# Patient Record
Sex: FEMALE | Race: WHITE | NOT HISPANIC OR LATINO | URBAN - METROPOLITAN AREA
[De-identification: names, ages, dates, MRNs, and addresses within clinical notes are randomized per-mention and may not be internally consistent; named-entity substitution may affect disease eponyms.]

---

## 2024-02-15 ENCOUNTER — INPATIENT (INPATIENT)
Facility: HOSPITAL | Age: 59
LOS: 1 days | Discharge: HOME | DRG: 377 | End: 2024-02-17
Attending: INTERNAL MEDICINE | Admitting: INTERNAL MEDICINE
Payer: COMMERCIAL

## 2024-02-15 VITALS
OXYGEN SATURATION: 100 % | HEART RATE: 83 BPM | DIASTOLIC BLOOD PRESSURE: 78 MMHG | WEIGHT: 242.07 LBS | HEIGHT: 67 IN | TEMPERATURE: 100 F | SYSTOLIC BLOOD PRESSURE: 158 MMHG | RESPIRATION RATE: 17 BRPM

## 2024-02-15 DIAGNOSIS — K92.2 GASTROINTESTINAL HEMORRHAGE, UNSPECIFIED: ICD-10-CM

## 2024-02-15 LAB
ABO RH CONFIRMATION: SIGNIFICANT CHANGE UP
ALBUMIN SERPL ELPH-MCNC: 2.9 G/DL — LOW (ref 3.3–5)
ALP SERPL-CCNC: 97 U/L — SIGNIFICANT CHANGE UP (ref 30–120)
ALT FLD-CCNC: 23 U/L — SIGNIFICANT CHANGE UP (ref 10–60)
ANION GAP SERPL CALC-SCNC: 8 MMOL/L — SIGNIFICANT CHANGE UP (ref 5–17)
APTT BLD: 25.5 SEC — SIGNIFICANT CHANGE UP (ref 24.5–35.6)
AST SERPL-CCNC: 14 U/L — SIGNIFICANT CHANGE UP (ref 10–40)
BASOPHILS # BLD AUTO: 0.02 K/UL — SIGNIFICANT CHANGE UP (ref 0–0.2)
BASOPHILS NFR BLD AUTO: 0.3 % — SIGNIFICANT CHANGE UP (ref 0–2)
BILIRUB SERPL-MCNC: 0.1 MG/DL — LOW (ref 0.2–1.2)
BUN SERPL-MCNC: 15 MG/DL — SIGNIFICANT CHANGE UP (ref 7–23)
CALCIUM SERPL-MCNC: 9.7 MG/DL — SIGNIFICANT CHANGE UP (ref 8.4–10.5)
CHLORIDE SERPL-SCNC: 105 MMOL/L — SIGNIFICANT CHANGE UP (ref 96–108)
CO2 SERPL-SCNC: 28 MMOL/L — SIGNIFICANT CHANGE UP (ref 22–31)
CREAT SERPL-MCNC: 0.68 MG/DL — SIGNIFICANT CHANGE UP (ref 0.5–1.3)
EGFR: 100 ML/MIN/1.73M2 — SIGNIFICANT CHANGE UP
EOSINOPHIL # BLD AUTO: 0.22 K/UL — SIGNIFICANT CHANGE UP (ref 0–0.5)
EOSINOPHIL NFR BLD AUTO: 3.4 % — SIGNIFICANT CHANGE UP (ref 0–6)
GLUCOSE SERPL-MCNC: 145 MG/DL — HIGH (ref 70–99)
HCT VFR BLD CALC: 18.6 % — CRITICAL LOW (ref 34.5–45)
HGB BLD-MCNC: 5.2 G/DL — CRITICAL LOW (ref 11.5–15.5)
IMM GRANULOCYTES NFR BLD AUTO: 0.5 % — SIGNIFICANT CHANGE UP (ref 0–0.9)
INR BLD: 1 RATIO — SIGNIFICANT CHANGE UP (ref 0.85–1.18)
LYMPHOCYTES # BLD AUTO: 1.32 K/UL — SIGNIFICANT CHANGE UP (ref 1–3.3)
LYMPHOCYTES # BLD AUTO: 20.6 % — SIGNIFICANT CHANGE UP (ref 13–44)
MCHC RBC-ENTMCNC: 24.3 PG — LOW (ref 27–34)
MCHC RBC-ENTMCNC: 28 GM/DL — LOW (ref 32–36)
MCV RBC AUTO: 86.9 FL — SIGNIFICANT CHANGE UP (ref 80–100)
MONOCYTES # BLD AUTO: 0.47 K/UL — SIGNIFICANT CHANGE UP (ref 0–0.9)
MONOCYTES NFR BLD AUTO: 7.3 % — SIGNIFICANT CHANGE UP (ref 2–14)
NEUTROPHILS # BLD AUTO: 4.35 K/UL — SIGNIFICANT CHANGE UP (ref 1.8–7.4)
NEUTROPHILS NFR BLD AUTO: 67.9 % — SIGNIFICANT CHANGE UP (ref 43–77)
NRBC # BLD: 1 /100 WBCS — HIGH (ref 0–0)
OB PNL STL: POSITIVE
PLATELET # BLD AUTO: 487 K/UL — HIGH (ref 150–400)
POTASSIUM SERPL-MCNC: 3.5 MMOL/L — SIGNIFICANT CHANGE UP (ref 3.5–5.3)
POTASSIUM SERPL-SCNC: 3.5 MMOL/L — SIGNIFICANT CHANGE UP (ref 3.5–5.3)
PROT SERPL-MCNC: 6.4 G/DL — SIGNIFICANT CHANGE UP (ref 6–8.3)
PROTHROM AB SERPL-ACNC: 10.9 SEC — SIGNIFICANT CHANGE UP (ref 9.5–13)
RBC # BLD: 2.14 M/UL — LOW (ref 3.8–5.2)
RBC # FLD: 16.6 % — HIGH (ref 10.3–14.5)
SODIUM SERPL-SCNC: 141 MMOL/L — SIGNIFICANT CHANGE UP (ref 135–145)
WBC # BLD: 6.41 K/UL — SIGNIFICANT CHANGE UP (ref 3.8–10.5)
WBC # FLD AUTO: 6.41 K/UL — SIGNIFICANT CHANGE UP (ref 3.8–10.5)

## 2024-02-15 PROCEDURE — 99223 1ST HOSP IP/OBS HIGH 75: CPT

## 2024-02-15 PROCEDURE — 93010 ELECTROCARDIOGRAM REPORT: CPT

## 2024-02-15 PROCEDURE — 99285 EMERGENCY DEPT VISIT HI MDM: CPT

## 2024-02-15 PROCEDURE — 71045 X-RAY EXAM CHEST 1 VIEW: CPT | Mod: 26

## 2024-02-15 RX ORDER — OXYCODONE HYDROCHLORIDE 5 MG/1
2.5 TABLET ORAL ONCE
Refills: 0 | Status: DISCONTINUED | OUTPATIENT
Start: 2024-02-15 | End: 2024-02-15

## 2024-02-15 RX ORDER — PANTOPRAZOLE SODIUM 20 MG/1
40 TABLET, DELAYED RELEASE ORAL ONCE
Refills: 0 | Status: COMPLETED | OUTPATIENT
Start: 2024-02-15 | End: 2024-02-15

## 2024-02-15 RX ORDER — ACETAMINOPHEN 500 MG
2 TABLET ORAL
Refills: 0 | DISCHARGE

## 2024-02-15 RX ORDER — OXYCODONE HYDROCHLORIDE 5 MG/1
1 TABLET ORAL
Refills: 0 | DISCHARGE

## 2024-02-15 RX ADMIN — PANTOPRAZOLE SODIUM 40 MILLIGRAM(S): 20 TABLET, DELAYED RELEASE ORAL at 21:54

## 2024-02-15 RX ADMIN — OXYCODONE HYDROCHLORIDE 2.5 MILLIGRAM(S): 5 TABLET ORAL at 23:35

## 2024-02-15 NOTE — H&P ADULT - NSHPREVIEWOFSYSTEMS_GEN_ALL_CORE
-    CONSTITUTIONAL: No fever or chills, feels tired.  EYES: No eye pain, visual disturbances, or discharge.  ENMT:  No difficulty hearing, vertigo, sinus or throat pain.  NECK: No pain or stiffness.	  RESPIRATORY: No cough, wheezing, or hemoptysis; No shortness of breath.  CARDIOVASCULAR: No chest pain, palpitations, dizziness, or leg swelling.  GASTROINTESTINAL: No abdominal pain, no nausea, vomiting, or hematemesis; No diarrhea or Change in bowel habits. No melena or hematochezia.  GENITOURINARY: No dysuria, frequency, hematuria, or incontinence.  NEUROLOGICAL: No headaches, focal muscle weakness, numbness, or tremors.  SKIN: No itching, burning or rashes.  MUSCULOSKELETAL: No joint swelling or pain other than surgical site.  PSYCHIATRIC: No depression, anxiety, or agitation.  HEME/LYMPH: No easy bruising, bleeding gums, or nose bleed.  ALLERGY AND IMMUNOLOGIC: No hives or eczema.

## 2024-02-15 NOTE — ED ADULT NURSE NOTE - CHIEF COMPLAINT QUOTE
PT sent in by Dr Mehta for low hemoglobin; when I dry to walk my BP drops and when I sit down I feel better; I had blood drawn this morning; hemoglobin is 5.6; I am feeling more tired; pt had right hip replacement 01/29; hx anemia; follows with hematology; last iron infusion was Thanksgiving week

## 2024-02-15 NOTE — H&P ADULT - PROBLEM SELECTOR PLAN 4
high risk patient for VTE, pharmacological DVT prophylaxis is contraindicated at this time, started her on B/L intermittent pneumatic compression device for mechanical DVT prophylaxis.

## 2024-02-15 NOTE — ED PROVIDER NOTE - OBJECTIVE STATEMENT
58-year-old female past medical history of hypertension, anemia (gets iron transfusions,  last transfusion was 11/2023), recent right hip replacement 1/29/24 at [facility] presents to the ED today for hemoglobin of 5.4 on routine labs.  Patient reports she has been feeling fatigued since being sent home so her PCP Dr Mehta did labs today patient received a phone call stating to come to the ER.  Patient denies any chest pain, shortness of breath, abdominal pain, nausea vomiting, fever chills or all other complaints.  Patient reports she takes iron supplements and her stool has been dark recently.

## 2024-02-15 NOTE — H&P ADULT - PROBLEM SELECTOR PLAN 1
melena with hemoglobin down to 5.2 gm/dl, no hematemesis or hematochezia, last EGD reportedly negative in 2022, known hiatal hernia, using NSAIDs for pain control before & after the hip replacement surgery, admitted to the hospital, started her on PPI, GI consult with Dr. Mota was called.

## 2024-02-15 NOTE — ED ADULT NURSE NOTE - PAIN RATING/NUMBER SCALE (0-10): ACTIVITY
0 (no pain/absence of nonverbal indicators of pain) Crescentic Advancement Flap Text: The defect edges were debeveled with a #15 scalpel blade.  Given the location of the defect and the proximity to free margins a crescentic advancement flap was deemed most appropriate.  Using a sterile surgical marker, the appropriate advancement flap was drawn incorporating the defect and placing the expected incisions within the relaxed skin tension lines where possible.    The area thus outlined was incised deep to adipose tissue with a #15 scalpel blade.  The skin margins were undermined to an appropriate distance in all directions utilizing iris scissors.

## 2024-02-15 NOTE — ED PROVIDER NOTE - ATTENDING APP SHARED VISIT CONTRIBUTION OF CARE
Zi Albert MD: I have personally performed a face to face diagnostic evaluation on this patient.  I have reviewed the PA note and agree with the history, exam, and plan of care, except as noted.  History and Exam by me shows same findings as documented

## 2024-02-15 NOTE — ED ADULT TRIAGE NOTE - CHIEF COMPLAINT QUOTE
PT sent in by Dr Mehta for low hemoglobin; when I dry to walk my BP drops and when I sit down I feel better; I had blood drawn this morning; hemoglobin is 5.6; I am feeling more tired; pt had right hip replacement 01/29; hx anemia; follows with hematology; last iron infusion was Thanksgiving week PT sent in by Dr Mehta for low hemoglobin; when I dry to walk my BP drops and when I sit down I feel better; I had blood drawn this morning; hemoglobin is 5.4; I am feeling more tired; pt had right hip replacement 01/29; hx anemia; follows with hematology; last iron infusion was Thanksgiving week

## 2024-02-15 NOTE — ED PROVIDER NOTE - CLINICAL SUMMARY MEDICAL DECISION MAKING FREE TEXT BOX
Patient sent to emergency department for anemia.  Patient is 2 weeks status post hip replacement.  Found to have hemoglobin of 5.3 today.  Patient has a history of anemia in the past.  Never had a blood transfusion.  Patient complains of some generalized fatigue.  Has also noted some dark stools.  Will check stool for blood and initiate transfusions.

## 2024-02-15 NOTE — ED ADULT TRIAGE NOTE - WEIGHT METHOD
RX faxed and labs ordered per protocol.    
The patient is a 47y Female complaining of unresponsive.
stated

## 2024-02-15 NOTE — ED PROVIDER NOTE - PROGRESS NOTE DETAILS
TASHI Harrell: CBC noted, hemoglobin is 5.2/18.6.  Guaiac is positive.  Patient consented for blood transfusion TASHI Harrell: CBC noted, hemoglobin is 5.2/18.6.  Guaiac is positive.  Patient consented for blood transfusion. Protonix given.  Will admit for GIB. Reports last endoscopy and colonoscopy was 2022 and was normal per patient

## 2024-02-15 NOTE — ED PROVIDER NOTE - CARE PLAN
1 Principal Discharge DX:	Symptomatic anemia   Principal Discharge DX:	GIB (gastrointestinal bleeding)  Secondary Diagnosis:	Symptomatic anemia

## 2024-02-15 NOTE — H&P ADULT - NSHPPHYSICALEXAM_GEN_ALL_CORE
-    Vital Signs Last 24 Hrs  T(C): 37 (15 Feb 2024 23:39), Max: 37.5 (15 Feb 2024 20:28)  T(F): 98.6 (15 Feb 2024 23:39), Max: 99.5 (15 Feb 2024 20:28)  HR: 75 (15 Feb 2024 23:39) (75 - 95)  BP: 165/60 (15 Feb 2024 23:39) (149/70 - 165/60)  BP(mean): --  RR: 18 (15 Feb 2024 23:39) (17 - 18)  SpO2: 100% (15 Feb 2024 23:39) (99% - 100%)    Parameters below as of 15 Feb 2024 23:05  Patient On (Oxygen Delivery Method): room air          PHYSICAL EXAM:  		  GENERAL: NAD, well-groomed, well-developed, not in any distress.  HEAD:  Atraumatic, Norm cephalic.  EYES: PERRLA, conjunctiva clear.  ENMT: no nasal discharge, MMM.   NECK: Supple, No JVD.  NERVOUS SYSTEM:  Alert & oriented X3, neurologically intact grossly.  CHEST/LUNG: Good air entry B/L, no rales, rhonchi, or wheezing.  HEART: Normal S1 & S2, grade II/VII ETHEL max over cardiac base, no extra sounds.  ABDOMEN: Soft, non-tender, obese non-distended; bowel sounds present, no palpable masses, (+) hepatomegally.  EXTREMITIES:  No clubbing, cyanosis, or lower extremity edema.  VASCULAR: 2+ radial, DPA / PTA pulses B/L.  SKIN: No rashes or lesions, (+) dry skin..  PSYCH: normal affect & behavior.

## 2024-02-15 NOTE — ED ADULT NURSE NOTE - NSFALLCONCLUSION_ED_ALL_ED
History  Chief Complaint   Patient presents with   • Palpitations     Pt arrives from home c/o "racing heart" x45 minutes. C/o SOB as well. This is a 50-year-old female who presents with palpitations chest tightness shortness of breath lightheadedness that started 45 minutes prior to arrival she is noted to be in a rapid SVT with heart rate in the 200s with a goal maneuvers were attempted without success      History provided by:  Patient  Medical Problem  Location:  Cardiac  Quality:  Palpitations  Severity:  Severe  Onset quality:  Sudden  Duration:  45 minutes  Timing:  Constant  Progression:  Unchanged  Chronicity:  New  Context:  Palpitations lightheadedness dizziness and shortness of breath x45 minutes  Associated symptoms: shortness of breath        Prior to Admission Medications   Prescriptions Last Dose Informant Patient Reported? Taking? Elagolix Sodium (Orilissa) 150 MG TABS   Yes No   Sig: Take by mouth One daily   NON FORMULARY  Self Yes No   Sig: Medical Marijuana   Sod Picosulfate-Mag Ox-Cit Acd (Clenpiq) 10-3.5-12 MG-GM -GM/160ML SOLN   No No   Sig: As directed (1 kit)   Patient not taking: Reported on 2023   cyclobenzaprine (FLEXERIL) 5 mg tablet  Self No No   Sig: Take 1 tablet (5 mg total) by mouth 3 (three) times a day as needed for muscle spasms   Patient not taking: Reported on 3/22/2022       Facility-Administered Medications: None       Past Medical History:   Diagnosis Date   • Anxiety    • Endometriosis    • PTSD (post-traumatic stress disorder)        Past Surgical History:   Procedure Laterality Date   •  SECTION     • LAPAROSCOPY     • TUBAL LIGATION         Family History   Problem Relation Age of Onset   • Hypertension Mother    • Colon cancer Neg Hx    • Colon polyps Neg Hx    • Inflammatory bowel disease Neg Hx    • Celiac disease Neg Hx      I have reviewed and agree with the history as documented.     E-Cigarette/Vaping   • E-Cigarette Use Never User E-Cigarette/Vaping Substances   • Nicotine No    • THC No    • CBD No    • Flavoring No    • Other No    • Unknown No      Social History     Tobacco Use   • Smoking status: Never   • Smokeless tobacco: Never   Vaping Use   • Vaping Use: Never used   Substance Use Topics   • Drug use: Yes     Types: Marijuana     Comment: medical marijuana       Review of Systems   Respiratory: Positive for chest tightness and shortness of breath. Neurological: Positive for light-headedness. All other systems reviewed and are negative. Physical Exam  Physical Exam  Vitals and nursing note reviewed. Constitutional:       General: She is in acute distress. HENT:      Head: Normocephalic and atraumatic. Right Ear: External ear normal.      Left Ear: External ear normal.   Eyes:      General: No scleral icterus. Right eye: No discharge. Left eye: No discharge. Extraocular Movements: Extraocular movements intact. Pupils: Pupils are equal, round, and reactive to light. Cardiovascular:      Rate and Rhythm: Regular rhythm. Tachycardia present. Pulses: Normal pulses. Heart sounds: No murmur heard. No friction rub. No gallop. Pulmonary:      Effort: Pulmonary effort is normal. No respiratory distress. Breath sounds: No stridor. No wheezing, rhonchi or rales. Abdominal:      General: There is no distension. Palpations: Abdomen is soft. Tenderness: There is no abdominal tenderness. There is no guarding or rebound. Musculoskeletal:         General: No swelling, tenderness, deformity or signs of injury. Normal range of motion. Cervical back: Normal range of motion and neck supple. No rigidity or tenderness. Right lower leg: No edema. Left lower leg: No edema. Skin:     General: Skin is warm and dry. Findings: No erythema or rash. Neurological:      General: No focal deficit present.       Mental Status: She is alert and oriented to person, place, and time. Cranial Nerves: No cranial nerve deficit. Sensory: No sensory deficit. Motor: No weakness.       Coordination: Coordination normal.   Psychiatric:      Comments: Anxious         Vital Signs  ED Triage Vitals   Temperature Pulse Respirations Blood Pressure SpO2   09/22/23 1100 09/22/23 0912 09/22/23 0912 09/22/23 0912 09/22/23 0912   98.4 °F (36.9 °C) (!) 212 20 139/88 100 %      Temp src Heart Rate Source Patient Position - Orthostatic VS BP Location FiO2 (%)   -- 09/22/23 0912 09/22/23 0912 09/22/23 0912 --    Monitor Lying Right arm       Pain Score       09/22/23 0912       No Pain           Vitals:    09/22/23 0930 09/22/23 1045 09/22/23 1100 09/22/23 1145   BP: 120/74 113/74 124/57 102/68   Pulse: 76 73 69 68   Patient Position - Orthostatic VS: Sitting Sitting  Sitting         Visual Acuity      ED Medications  Medications   metoprolol succinate (TOPROL-XL) 24 hr tablet 25 mg (has no administration in time range)   adenosine (ADENOCARD) 6 mg/2 mL injection **ADS Override Pull** (  Given 9/22/23 0921)       Diagnostic Studies  Results Reviewed     Procedure Component Value Units Date/Time    HS Troponin 0hr (reflex protocol) [[id number]]  (Normal) Collected: 09/22/23 0924    Lab Status: Final result Specimen: Blood from Arm, Right Updated: 09/22/23 1000     hs TnI 0hr 2 ng/L     Comprehensive metabolic panel [[id number]] Collected: 09/22/23 0924    Lab Status: Final result Specimen: Blood from Arm, Right Updated: 09/22/23 0952     Sodium 136 mmol/L      Potassium 3.7 mmol/L      Chloride 105 mmol/L      CO2 22 mmol/L      ANION GAP 9 mmol/L      BUN 14 mg/dL      Creatinine 0.94 mg/dL      Glucose 126 mg/dL      Calcium 9.7 mg/dL      AST 21 U/L      ALT 10 U/L      Alkaline Phosphatase 37 U/L      Total Protein 8.0 g/dL      Albumin 4.9 g/dL      Total Bilirubin 0.66 mg/dL      eGFR 74 ml/min/1.73sq m     Narrative:      [place] guidelines for Chronic Kidney Disease (CKD):   •  Stage 1 with normal or high GFR (GFR > 90 mL/min/1.73 square meters)  •  Stage 2 Mild CKD (GFR = 60-89 mL/min/1.73 square meters)  •  Stage 3A Moderate CKD (GFR = 45-59 mL/min/1.73 square meters)  •  Stage 3B Moderate CKD (GFR = 30-44 mL/min/1.73 square meters)  •  Stage 4 Severe CKD (GFR = 15-29 mL/min/1.73 square meters)  •  Stage 5 End Stage CKD (GFR <15 mL/min/1.73 square meters)  Note: GFR calculation is accurate only with a steady state creatinine    CBC and differential [[id number]] Collected: 09/22/23 0924    Lab Status: Final result Specimen: Blood from Arm, Right Updated: 09/22/23 0936     WBC 7.69 Thousand/uL      RBC 4.34 Million/uL      Hemoglobin 13.5 g/dL      Hematocrit 41.5 %      MCV 96 fL      MCH 31.1 pg      MCHC 32.5 g/dL      RDW 13.2 %      MPV 10.3 fL      Platelets 865 Thousands/uL      nRBC 0 /100 WBCs      Neutrophils Relative 55 %      Immat GRANS % 0 %      Lymphocytes Relative 35 %      Monocytes Relative 7 %      Eosinophils Relative 2 %      Basophils Relative 1 %      Neutrophils Absolute 4.31 Thousands/µL      Immature Grans Absolute 0.03 Thousand/uL      Lymphocytes Absolute 2.65 Thousands/µL      Monocytes Absolute 0.53 Thousand/µL      Eosinophils Absolute 0.12 Thousand/µL      Basophils Absolute 0.05 Thousands/µL                  XR chest 1 view portable   Final Result by Angel Beatty MD (09/22 6149)      No acute cardiopulmonary disease.                Workstation performed: [id number]                    Procedures  ECG 12 Lead Documentation Only    Date/Time: 9/22/2023 9:24 AM    Performed by: Wilmar Fritz DO  Authorized by: Wilmar Fritz DO    ECG reviewed by me, the ED Provider: yes    Patient location:  ED  Rate:     ECG rate:  78  Rhythm:     Rhythm: sinus rhythm    Conduction:     Conduction: normal    T waves:     T waves: normal      CriticalCare Time    Date/Time: 9/22/2023 12:03 PM    Performed by: Wilmar Fritz   Authorized by: Phi Gonzalez DO    Critical care provider statement:     Critical care time (minutes):  30    Critical care time was exclusive of:  Separately billable procedures and treating other patients    Critical care was necessary to treat or prevent imminent or life-threatening deterioration of the following conditions: Chemical cardioversion. Critical care was time spent personally by me on the following activities:  Discussions with consultants, evaluation of patient's response to treatment, examination of patient, interpretation of cardiac output measurements, ordering and performing treatments and interventions, ordering and review of laboratory studies, ordering and review of radiographic studies and re-evaluation of patient's condition    I assumed direction of critical care for this patient from another provider in my specialty: no                   ED Course  ED Course as of 09/22/23 1204   Fri Sep 22, 2023   1153 Discussed case with cardiology Dr. Alex paige to start patient on metoprolol 25 mg XL and follow-up as outpatient with electrophysiology                                             Medical Decision Making  Narrow complex tachycardia vagal maneuvers attempted without success will give adenosine check lab work    Amount and/or Complexity of Data Reviewed  Labs: ordered. Radiology: ordered. Disposition  Final diagnoses:   SVT (supraventricular tachycardia) ([address])     Time reflects when diagnosis was documented in both MDM as applicable and the Disposition within this note     Time User Action Codes Description Comment    9/22/2023  9:40 AM Taran Quiet Add [I47.1] SVT (supraventricular tachycardia)       ED Disposition     ED Disposition   Discharge    Condition   Stable    Date/Time   Fri Sep 22, 2023 11:56 AM    Comment   Livia Rebolledo discharge to home/self care.                Follow-up Information     Follow up With Specialties Details Why Contact Info    [facility] Walker Chu DO Cardiology   [address] [facility]  [address]  [address] [address]  [phone]            Patient's Medications   Discharge Prescriptions    METOPROLOL SUCCINATE (TOPROL-XL) 25 MG 24 HR TABLET    Take 1 tablet (25 mg total) by mouth daily       Start Date: 9/22/2023 End Date: --       Order Dose: 25 mg       Quantity: 20 tablet    Refills: 0           PDMP Review     None          ED Provider  Electronically Signed by           Patti Funes DO  09/22/23 1200 Universal Safety Interventions

## 2024-02-15 NOTE — H&P ADULT - NSHPLABSRESULTS_GEN_ALL_CORE
-                          5.2    6.41  )-----------( 487      ( 15 Feb 2024 21:07 )             18.6       15 Feb 2024 21:07    141    |  105    |  15     ----------------------------<  145    3.5     |  28     |  0.68     Ca    9.7        15 Feb 2024 21:07    TPro  6.4    /  Alb  2.9    /  TBili  0.1    /  DBili  x      /  AST  14     /  ALT  23     /  AlkPhos  97     15 Feb 2024 21:07    LIVER FUNCTIONS - ( 15 Feb 2024 21:07 )  Alb: 2.9 g/dL / Pro: 6.4 g/dL / ALK PHOS: 97 U/L / ALT: 23 U/L / AST: 14 U/L / GGT: x           PT/INR - ( 15 Feb 2024 21:07 )   PT: 10.9 sec;   INR: 1.00 ratio    PTT - ( 15 Feb 2024 21:07 )  PTT:25.5 sec      Urinalysis Basic - ( 15 Feb 2024 21:07 )  Color: x / Appearance: x / SG: x / pH: x  Gluc: 145 mg/dL / Ketone: x  / Bili: x / Urobili: x   Blood: x / Protein: x / Nitrite: x   Leuk Esterase: x / RBC: x / WBC x   Sq Epi: x / Non Sq Epi: x / Bacteria: x        Occult Blood, Feces (02.15.24 @ 21:12)   Occult Blood, Feces: Positive        CXR:    As per my review shows normal cardiac shadow size, clear lung fields B/L, no pulmonary infiltrates, pleural effusion, or pneumothorax. Pending official report.           EKG:    As per my review shows SR at 80/min, normal ID & QTc intervals, normal QRS voltage, duration, and axis (zero), with normal transition, no ST-T abnormality.    -

## 2024-02-15 NOTE — H&P ADULT - PROBLEM SELECTOR PLAN 2
reports feeling tiered & out of energy, gets fatigued very reasy since the surgery, also reported getting light headed on any mobility after the surgery & was not allowed to go back home after surgery for this reason, patient received 1 unit of PRBCs so far, and currently with the 2nd unit, will check H & H after transfusion & transfuse as needed, check TSH, please consider parenteral iron transfusion prior to discharge. Patient is following up for this issue with a hematologist as an outpatient.

## 2024-02-15 NOTE — PATIENT PROFILE ADULT - FALL HARM RISK - HARM RISK INTERVENTIONS

## 2024-02-15 NOTE — ED PROVIDER NOTE - PHYSICAL EXAMINATION
Gen: Pale skin,  NAD  Head: atraumatic  Heart: s1/s2, RRR  Lung: CTA b/l,   Abd: soft, NT/ND, no rebound or guarding,   GUIAC: +dark stool on exam   Msk: no pedal edema  Neuro: AAO x3, patient moving all extremity equally, no focal neuro deficits noted  Skin: right hip surgical wound is well appearing, no signs of infection noted.   Psych: Alert and oriented

## 2024-02-15 NOTE — ED ADULT NURSE NOTE - OBJECTIVE STATEMENT
Pt sent by dr wei for low hemoglobin, pt stated it was 5.6, has been feeling very tired, and whenever she tries to walk, she feels lightheaded, and better when she sits down, had iron transfusion in november.

## 2024-02-15 NOTE — H&P ADULT - PROBLEM SELECTOR PLAN 3
RPG of 145 mg/dl, no H/O DM or glucose intolerance, will check glycated hemoglobin level with estimated average plasma glucose level in am.

## 2024-02-15 NOTE — H&P ADULT - HISTORY OF PRESENT ILLNESS
This is a 57 y/o F with PMH of HTN, RIYA on parenteral iron therapy, Obesity, and OA who was sent by her PCP for blood transfusion. Patient was feeling weak & tired, and had no energy, seen by her PCP yesterday who ran blood tests, today he called her & sent her to the ED as her hemoglobin is very low. At the ED she was found with hemoglobin of 5.2 gm/dl. Of note that she was seeing a hematologist for RIYA, receiving IV iron on regular basis, last she received 4 units in october & one immediately after thanksgiving, in preparation to her total hip arthroplasty that she had on January 29th, failed PT on POD1 because she was feeling light headed every time she tries to walk, her pre-op Hb was 11.3 gm/dl, her post-op Hb was 9.6 gm/dl, no visible bleeding from anywhere, reports black stools, she is using PO iron supplements at home, tested positive for FOB at the ED.  Patient denies any chest pain or SOB, no abdominal pain or acid reflux symptoms. Last EGD & colonoscopy performed in 2022, both reportedly were "ok".

## 2024-02-15 NOTE — H&P ADULT - ASSESSMENT
57 y/o F with PMH of HTN, RIYA on parenteral iron therapy, Obesity, and OA sent for blood transfusion.

## 2024-02-16 DIAGNOSIS — R73.09 OTHER ABNORMAL GLUCOSE: ICD-10-CM

## 2024-02-16 DIAGNOSIS — Z96.641 PRESENCE OF RIGHT ARTIFICIAL HIP JOINT: Chronic | ICD-10-CM

## 2024-02-16 DIAGNOSIS — D64.9 ANEMIA, UNSPECIFIED: ICD-10-CM

## 2024-02-16 DIAGNOSIS — Z96.641 PRESENCE OF RIGHT ARTIFICIAL HIP JOINT: ICD-10-CM

## 2024-02-16 DIAGNOSIS — Z29.9 ENCOUNTER FOR PROPHYLACTIC MEASURES, UNSPECIFIED: ICD-10-CM

## 2024-02-16 DIAGNOSIS — K92.2 GASTROINTESTINAL HEMORRHAGE, UNSPECIFIED: ICD-10-CM

## 2024-02-16 LAB
BASOPHILS # BLD AUTO: 0.03 K/UL — SIGNIFICANT CHANGE UP (ref 0–0.2)
BASOPHILS NFR BLD AUTO: 0.4 % — SIGNIFICANT CHANGE UP (ref 0–2)
EOSINOPHIL # BLD AUTO: 0.22 K/UL — SIGNIFICANT CHANGE UP (ref 0–0.5)
EOSINOPHIL NFR BLD AUTO: 2.8 % — SIGNIFICANT CHANGE UP (ref 0–6)
HCT VFR BLD CALC: 22.8 % — LOW (ref 34.5–45)
HGB BLD-MCNC: 6.7 G/DL — CRITICAL LOW (ref 11.5–15.5)
IMM GRANULOCYTES NFR BLD AUTO: 0.8 % — SIGNIFICANT CHANGE UP (ref 0–0.9)
LYMPHOCYTES # BLD AUTO: 1.3 K/UL — SIGNIFICANT CHANGE UP (ref 1–3.3)
LYMPHOCYTES # BLD AUTO: 16.4 % — SIGNIFICANT CHANGE UP (ref 13–44)
MCHC RBC-ENTMCNC: 24.9 PG — LOW (ref 27–34)
MCHC RBC-ENTMCNC: 29.4 GM/DL — LOW (ref 32–36)
MCV RBC AUTO: 84.8 FL — SIGNIFICANT CHANGE UP (ref 80–100)
MONOCYTES # BLD AUTO: 0.6 K/UL — SIGNIFICANT CHANGE UP (ref 0–0.9)
MONOCYTES NFR BLD AUTO: 7.6 % — SIGNIFICANT CHANGE UP (ref 2–14)
NEUTROPHILS # BLD AUTO: 5.73 K/UL — SIGNIFICANT CHANGE UP (ref 1.8–7.4)
NEUTROPHILS NFR BLD AUTO: 72 % — SIGNIFICANT CHANGE UP (ref 43–77)
NRBC # BLD: 0 /100 WBCS — SIGNIFICANT CHANGE UP (ref 0–0)
PLATELET # BLD AUTO: 468 K/UL — HIGH (ref 150–400)
RBC # BLD: 2.69 M/UL — LOW (ref 3.8–5.2)
RBC # FLD: 17.2 % — HIGH (ref 10.3–14.5)
TSH SERPL-MCNC: 3.54 UIU/ML — SIGNIFICANT CHANGE UP (ref 0.27–4.2)
WBC # BLD: 7.94 K/UL — SIGNIFICANT CHANGE UP (ref 3.8–10.5)
WBC # FLD AUTO: 7.94 K/UL — SIGNIFICANT CHANGE UP (ref 3.8–10.5)

## 2024-02-16 PROCEDURE — 99233 SBSQ HOSP IP/OBS HIGH 50: CPT

## 2024-02-16 PROCEDURE — 88312 SPECIAL STAINS GROUP 1: CPT | Mod: 26

## 2024-02-16 PROCEDURE — 88342 IMHCHEM/IMCYTCHM 1ST ANTB: CPT | Mod: 26

## 2024-02-16 PROCEDURE — 88305 TISSUE EXAM BY PATHOLOGIST: CPT | Mod: 26

## 2024-02-16 DEVICE — CLIP RESOLUTION 360 235CM: Type: IMPLANTABLE DEVICE | Status: FUNCTIONAL

## 2024-02-16 DEVICE — ESOPHAGEAL BALLOON CATH CRE FIXED WIRE 12-13.5-15MM: Type: IMPLANTABLE DEVICE | Status: FUNCTIONAL

## 2024-02-16 DEVICE — SPEEDBAND SPRVW 7: Type: IMPLANTABLE DEVICE | Status: FUNCTIONAL

## 2024-02-16 DEVICE — RESOLUTION CLIP HEMOSTATIC DEVICE: Type: IMPLANTABLE DEVICE | Status: FUNCTIONAL

## 2024-02-16 DEVICE — RETRIEVAL FOOD BOLUS ROTHNET: Type: IMPLANTABLE DEVICE | Status: FUNCTIONAL

## 2024-02-16 RX ORDER — PANTOPRAZOLE SODIUM 20 MG/1
40 TABLET, DELAYED RELEASE ORAL
Refills: 0 | Status: DISCONTINUED | OUTPATIENT
Start: 2024-02-16 | End: 2024-02-16

## 2024-02-16 RX ORDER — OXYCODONE HYDROCHLORIDE 5 MG/1
5 TABLET ORAL EVERY 4 HOURS
Refills: 0 | Status: DISCONTINUED | OUTPATIENT
Start: 2024-02-16 | End: 2024-02-17

## 2024-02-16 RX ORDER — PANTOPRAZOLE SODIUM 20 MG/1
40 TABLET, DELAYED RELEASE ORAL
Refills: 0 | Status: DISCONTINUED | OUTPATIENT
Start: 2024-02-16 | End: 2024-02-17

## 2024-02-16 RX ORDER — ACETAMINOPHEN 500 MG
650 TABLET ORAL EVERY 6 HOURS
Refills: 0 | Status: DISCONTINUED | OUTPATIENT
Start: 2024-02-16 | End: 2024-02-17

## 2024-02-16 RX ORDER — SUCRALFATE 1 G
1 TABLET ORAL EVERY 12 HOURS
Refills: 0 | Status: DISCONTINUED | OUTPATIENT
Start: 2024-02-16 | End: 2024-02-17

## 2024-02-16 RX ADMIN — Medication 650 MILLIGRAM(S): at 12:45

## 2024-02-16 RX ADMIN — Medication 650 MILLIGRAM(S): at 05:06

## 2024-02-16 RX ADMIN — OXYCODONE HYDROCHLORIDE 5 MILLIGRAM(S): 5 TABLET ORAL at 05:06

## 2024-02-16 RX ADMIN — PANTOPRAZOLE SODIUM 40 MILLIGRAM(S): 20 TABLET, DELAYED RELEASE ORAL at 05:07

## 2024-02-16 RX ADMIN — Medication 650 MILLIGRAM(S): at 20:23

## 2024-02-16 RX ADMIN — OXYCODONE HYDROCHLORIDE 5 MILLIGRAM(S): 5 TABLET ORAL at 05:36

## 2024-02-16 RX ADMIN — Medication 650 MILLIGRAM(S): at 19:53

## 2024-02-16 RX ADMIN — Medication 1 GRAM(S): at 17:59

## 2024-02-16 RX ADMIN — Medication 650 MILLIGRAM(S): at 05:36

## 2024-02-16 RX ADMIN — Medication 650 MILLIGRAM(S): at 12:15

## 2024-02-16 RX ADMIN — OXYCODONE HYDROCHLORIDE 2.5 MILLIGRAM(S): 5 TABLET ORAL at 00:55

## 2024-02-16 NOTE — CARE COORDINATION ASSESSMENT. - NSCAREPROVIDERS_GEN_ALL_CORE_FT
CARE PROVIDERS:  Accepting Physician: Selam Gatica  Administration: Elan Burdick  Administration: Lucila Cross  Administration: Ori Bradford  Admitting: Selam Gatica  Attending: eSlam Gatica  Case Management: Miguelito Brumfield  Consultant: Pravin Mota  Covering Team: MARCELLA Schmitz  ED ACP: Christina Harrell  ED Attending: Zi Albert ED Nurse: Jasmina Peña  HIM/Billing & Coding: Saman Carrasquillo  Infection Control: Bonnie Fitch  Nurse: Jamaica Irby  Nurse: Shannon Moreno  Nurse: Batsheva Fitch  Nurse: Jose Negron  Ordered: Doctor, Unknown  Override: Batsheva Fitch  Override: Jose Negron  PCA/Nursing Assistant: Nita Berg  PCA/Nursing Assistant: Nu Guadarrama  Physical Therapy: Lynette Martinez  Registered Dietitian: Luz Schaefer  Team: GLORIA  Hospitalists, Team  UR// Supp. Assoc.: Evelia Zamorano

## 2024-02-16 NOTE — CHART NOTE - NSCHARTNOTEFT_GEN_A_CORE
Patient is medically cleared for upper endoscopy. She has no known cardiac or pulmonary issues. She has received 3 units with expected Hg to be above 7. No contraindication to proceeding.

## 2024-02-16 NOTE — CONSULT NOTE ADULT - ASSESSMENT
anemia  gi bleed melena   s/p surgery blood loss    p;melisa  npo  ivf   upper gastrointestinal endoscopy today  after prbc transfusion  ppi bid   to discuss with patient

## 2024-02-16 NOTE — CARE COORDINATION ASSESSMENT. - NSDCPLANSERVICES_GEN_ALL_CORE
59 y/o F with PMH of HTN, RIYA on parenteral iron therapy, Obesity, and OA sent for blood transfusion.        Patient is receiving her 3th unit of blood today.  Pending EGD.    CM met with patient at bedside. Patient is alert times 3.  Patient stated lives with spouse at home Renato [phone] CM spoke with him this morning.  Patient stated owns a cane and walker and its at the bedside. Patient stated lives in a split level house has 6 steps to enter and 7 steps to get to bedroom.  Prior to patient coming into the hospital patient has been receiving [facility] [phone] Home PT from her hip surgery she had on January 29,2024 at [facility].    CM verified:     PCP: Tyson Hawkins [phone].  Insurance: HTP.  Pharmacy: [address]  : Patient stated No.     CM explained about homecare services with patient with a verbal understanding.  Patient Pending a PT consult. Patient stated wants [facility] [phone] because had prior to coming into hospital. Will continue to follow case./Home Care

## 2024-02-16 NOTE — CONSULT NOTE ADULT - SUBJECTIVE AND OBJECTIVE BOX
Chief Complaint:  Patient is a 58y old  Female who presents with a chief complaint of Sent for abnormal labs called to see pt for anmeia with history of melelna and anemia  had anemia as an outpt This is a 57 y/o F with PMH of HTN, RIYA on parenteral iron therapy, Obesity, and OA who was sent by her PCP for blood transfusion. Patient was feeling weak & tired, and had no energy, seen by her PCP yesterday who ran blood tests, today he called her & sent her to the ED as her hemoglobin is very low. At the ED she was found with hemoglobin of 5.2 gm/dl. Of note that she was seeing a hematologist for RIYA, receiving IV iron on regular basis, last she received 4 units in october & one immediately after thanksgiving, in preparation to her total hip arthroplasty that she had on January 29th, failed PT on POD1 because she was feeling light headed every time she tries to walk, her pre-op Hb was 11.3 gm/dl, her post-op Hb was 9.6 gm/dl, no visible bleeding from anywhere, reports black stools, she is using PO iron supplements at home, tested positive for FOB at the ED.  Patient denies any chest pain or SOB, no abdominal pain or acid reflux symptoms. Last EGD & colonoscopy performed in 2022, both reportedly were "ok".       Review of Systems:  Review of Systems: -    CONSTITUTIONAL: No fever or chills, feels tired.  EYES: No eye pain, visual disturbances, or discharge.  ENMT:  No difficulty hearing, vertigo, sinus or throat pain.  NECK: No pain or stiffness.	  RESPIRATORY: No cough, wheezing, or hemoptysis; No shortness of breath.  CARDIOVASCULAR: No chest pain, palpitations, dizziness, or leg swelling.  GASTROINTESTINAL: No abdominal pain, no nausea, vomiting, or hematemesis; No diarrhea or Change in bowel habits. No melena or hematochezia.  GENITOURINARY: No dysuria, frequency, hematuria, or incontinence.  NEUROLOGICAL: No headaches, focal muscle weakness, numbness, or tremors.  SKIN: No itching, burning or rashes.  MUSCULOSKELETAL: No joint swelling or pain other than surgical site.  PSYCHIATRIC: No depression, anxiety, or agitation.  HEME/LYMPH: No easy bruising, bleeding gums, or nose bleed.  ALLERGY AND IMMUNOLOGIC: No hives or eczema.      Allergies:  Berries (Unknown)  Tur[place] (Unknown)  Mushrooms (Unknown)  No Known Drug Allergies      Medications:  acetaminophen     Tablet .. 650 milliGRAM(s) Oral every 6 hours PRN  oxyCODONE    IR 5 milliGRAM(s) Oral every 4 hours PRN      PMHX/PSHX:  Iron deficiency anemia    OA (osteoarthritis)    Obesity    S/P hip replacement, right        Family history:      Social History:     ROS:     General:  No wt loss, fevers, chills, night sweats, fatigue,   Eyes:  Good vision, no reported pain  ENT:  No sore throat, pain, runny nose, dysphagia  CV:  No pain, palpitations, hypo/hypertension  Resp:  No dyspnea, cough, tachypnea, wheezing  GI:  No pain, No nausea, No vomiting, No diarrhea, No constipation, No weight loss, No fever, No pruritis, No rectal bleeding, No tarry stools, No dysphagia,  :  No pain, bleeding, incontinence, nocturia  Muscle:  No pain, weakness  Neuro:  No weakness, tingling, memory problems  Psych:  No fatigue, insomnia, mood problems, depression  Endocrine:  No polyuria, polydipsia, cold/heat intolerance  Heme:  No petechiae, ecchymosis, easy bruisability  Skin:  No rash, tattoos, scars, edema      PHYSICAL EXAM:   Vital Signs:  Vital Signs Last 24 Hrs  T(C): 37.1 (16 Feb 2024 05:56), Max: 37.5 (15 Feb 2024 20:28)  T(F): 98.7 (16 Feb 2024 05:56), Max: 99.5 (15 Feb 2024 20:28)  HR: 73 (16 Feb 2024 05:56) (73 - 95)  BP: 139/80 (16 Feb 2024 05:56) (128/78 - 165/60)  BP(mean): --  RR: 17 (16 Feb 2024 05:56) (17 - 18)  SpO2: 98% (16 Feb 2024 05:56) (97% - 100%)    Parameters below as of 16 Feb 2024 05:56  Patient On (Oxygen Delivery Method): room air      Daily Height in cm: 170.18 (15 Feb 2024 20:28)    Daily     GENERAL:  Appears stated age, well-groomed, well-nourished, no distress  HEENT:  NC/AT,  conjunctivae clear and pink, no thyromegaly, nodules, adenopathy, no JVD, sclera -anicteric  CHEST:  Full & symmetric excursion, no increased effort, breath sounds clear  HEART:  Regular rhythm, S1, S2, no murmur/rub/S3/S4, no abdominal bruit, no edema  ABDOMEN:  Soft, non-tender, non-distended, normoactive bowel sounds,  no masses ,no hepato-splenomegaly, no signs of chronic liver disease  EXTEREMITIES:  no cyanosis,clubbing or edema  SKIN:  No rash/erythema/ecchymoses/petechiae/wounds/abscess/warm/dry  NEURO:  Alert, oriented, no asterixis, no tremor, no encephalopathy    LABS:                        5.2    6.41  )-----------( 487      ( 15 Feb 2024 21:07 )             18.6     02-15    141  |  105  |  15  ----------------------------<  145<H>  3.5   |  28  |  0.68    Ca    9.7      15 Feb 2024 21:07    TPro  6.4  /  Alb  2.9<L>  /  TBili  0.1<L>  /  DBili  x   /  AST  14  /  ALT  23  /  AlkPhos  97  02-15    LIVER FUNCTIONS - ( 15 Feb 2024 21:07 )  Alb: 2.9 g/dL / Pro: 6.4 g/dL / ALK PHOS: 97 U/L / ALT: 23 U/L / AST: 14 U/L / GGT: x           PT/INR - ( 15 Feb 2024 21:07 )   PT: 10.9 sec;   INR: 1.00 ratio         PTT - ( 15 Feb 2024 21:07 )  PTT:25.5 sec  Urinalysis Basic - ( 15 Feb 2024 21:07 )    Color: x / Appearance: x / SG: x / pH: x  Gluc: 145 mg/dL / Ketone: x  / Bili: x / Urobili: x   Blood: x / Protein: x / Nitrite: x   Leuk Esterase: x / RBC: x / WBC x   Sq Epi: x / Non Sq Epi: x / Bacteria: x          Imaging:          
Normal vision: sees adequately in most situations; can see medication labels, newsprint

## 2024-02-16 NOTE — CARE COORDINATION ASSESSMENT. - OTHER PERTINENT DISCHARGE PLANNING INFORMATION:
57 y/o F with PMH of HTN, RIYA on parenteral iron therapy, Obesity, and OA sent for blood transfusion. Met patient at bedside.  Explained role of CM, verbalized understanding. Pt was made aware a CM will remain available through hospitalization.  Contact information given in discharge/ transitions resource folder.

## 2024-02-16 NOTE — CARE COORDINATION ASSESSMENT. - PRO ARRIVE FROM
DX:59 y/o F with PMH of HTN, RIYA on parenteral iron therapy, Obesity, and OA sent for blood transfusion.     Patient is receiving her 3th unit of blood today.  Pending EGD.    CM met with patient at bedside. Patient is alert times 3.  Patient stated lives with spouse at home Ren[place] [phone] CM spoke with him this morning.  Patient stated owns a cane and walker and its at the bedside. Patient stated lives in a split level house has 6 steps to enter and 7 steps to get to bedroom.  Prior to patient coming into the hospital patient has been receiving [facility] [phone] Home PT from her hip surgery she had on January 29,2024 at [facility].    CM verified:     PCP: Tyson Hawkins [phone].  Insurance: [employer].  Pharmacy: [address]  : Patient stated No.     CM explained about homecare services with patient with a verbal understanding.  Patient Pending a PT consult. Patient stated wants [facility] [phone] because had prior to coming into hospital. Will continue to follow case./home

## 2024-02-16 NOTE — PROGRESS NOTE ADULT - PROBLEM SELECTOR PLAN 1
- Likely UGIB in setting of NSAID use  - Protonix IV BID  - GI consult and plan for EGD  - Transfuse to goal of 7

## 2024-02-16 NOTE — PROGRESS NOTE ADULT - PROBLEM SELECTOR PLAN 3
- Recent surgery at [facility] on 1/30 and post-op unremarkable  - PT consult (from home)  - Pain control with Tylenol & Oxycodone PRN

## 2024-02-16 NOTE — CARE COORDINATION ASSESSMENT. - NSPASTMEDSURGHISTORY_GEN_ALL_CORE_FT
PAST MEDICAL & SURGICAL HISTORY:  Obesity      OA (osteoarthritis)      Iron deficiency anemia      S/P hip replacement, right

## 2024-02-17 VITALS
OXYGEN SATURATION: 96 % | TEMPERATURE: 99 F | DIASTOLIC BLOOD PRESSURE: 78 MMHG | RESPIRATION RATE: 18 BRPM | HEART RATE: 71 BPM | SYSTOLIC BLOOD PRESSURE: 127 MMHG

## 2024-02-17 LAB
ANION GAP SERPL CALC-SCNC: 5 MMOL/L — SIGNIFICANT CHANGE UP (ref 5–17)
BUN SERPL-MCNC: 12 MG/DL — SIGNIFICANT CHANGE UP (ref 7–23)
CALCIUM SERPL-MCNC: 10.1 MG/DL — SIGNIFICANT CHANGE UP (ref 8.4–10.5)
CHLORIDE SERPL-SCNC: 104 MMOL/L — SIGNIFICANT CHANGE UP (ref 96–108)
CO2 SERPL-SCNC: 28 MMOL/L — SIGNIFICANT CHANGE UP (ref 22–31)
CREAT SERPL-MCNC: 0.62 MG/DL — SIGNIFICANT CHANGE UP (ref 0.5–1.3)
EGFR: 103 ML/MIN/1.73M2 — SIGNIFICANT CHANGE UP
FERRITIN SERPL-MCNC: 34 NG/ML — SIGNIFICANT CHANGE UP (ref 13–330)
GLUCOSE SERPL-MCNC: 85 MG/DL — SIGNIFICANT CHANGE UP (ref 70–99)
HCT VFR BLD CALC: 25.9 % — LOW (ref 34.5–45)
HCV AB S/CO SERPL IA: 0.08 S/CO — SIGNIFICANT CHANGE UP (ref 0–0.99)
HCV AB SERPL-IMP: SIGNIFICANT CHANGE UP
HGB BLD-MCNC: 7.6 G/DL — LOW (ref 11.5–15.5)
IRON SATN MFR SERPL: 20 UG/DL — LOW (ref 30–160)
IRON SATN MFR SERPL: 5 % — LOW (ref 14–50)
MCHC RBC-ENTMCNC: 25.2 PG — LOW (ref 27–34)
MCHC RBC-ENTMCNC: 29.3 GM/DL — LOW (ref 32–36)
MCV RBC AUTO: 85.8 FL — SIGNIFICANT CHANGE UP (ref 80–100)
NRBC # BLD: 0 /100 WBCS — SIGNIFICANT CHANGE UP (ref 0–0)
PLATELET # BLD AUTO: 502 K/UL — HIGH (ref 150–400)
POTASSIUM SERPL-MCNC: 4.3 MMOL/L — SIGNIFICANT CHANGE UP (ref 3.5–5.3)
POTASSIUM SERPL-SCNC: 4.3 MMOL/L — SIGNIFICANT CHANGE UP (ref 3.5–5.3)
RBC # BLD: 3.02 M/UL — LOW (ref 3.8–5.2)
RBC # FLD: 17 % — HIGH (ref 10.3–14.5)
SODIUM SERPL-SCNC: 137 MMOL/L — SIGNIFICANT CHANGE UP (ref 135–145)
TIBC SERPL-MCNC: 387 UG/DL — SIGNIFICANT CHANGE UP (ref 220–430)
UIBC SERPL-MCNC: 367 UG/DL — SIGNIFICANT CHANGE UP (ref 110–370)
WBC # BLD: 9.43 K/UL — SIGNIFICANT CHANGE UP (ref 3.8–10.5)
WBC # FLD AUTO: 9.43 K/UL — SIGNIFICANT CHANGE UP (ref 3.8–10.5)

## 2024-02-17 PROCEDURE — 86850 RBC ANTIBODY SCREEN: CPT

## 2024-02-17 PROCEDURE — 80053 COMPREHEN METABOLIC PANEL: CPT

## 2024-02-17 PROCEDURE — 86923 COMPATIBILITY TEST ELECTRIC: CPT

## 2024-02-17 PROCEDURE — 85027 COMPLETE CBC AUTOMATED: CPT

## 2024-02-17 PROCEDURE — 83550 IRON BINDING TEST: CPT

## 2024-02-17 PROCEDURE — 96374 THER/PROPH/DIAG INJ IV PUSH: CPT

## 2024-02-17 PROCEDURE — 88342 IMHCHEM/IMCYTCHM 1ST ANTB: CPT

## 2024-02-17 PROCEDURE — P9016: CPT

## 2024-02-17 PROCEDURE — 83036 HEMOGLOBIN GLYCOSYLATED A1C: CPT

## 2024-02-17 PROCEDURE — 85610 PROTHROMBIN TIME: CPT

## 2024-02-17 PROCEDURE — 36430 TRANSFUSION BLD/BLD COMPNT: CPT

## 2024-02-17 PROCEDURE — 86803 HEPATITIS C AB TEST: CPT

## 2024-02-17 PROCEDURE — 85025 COMPLETE CBC W/AUTO DIFF WBC: CPT

## 2024-02-17 PROCEDURE — 82272 OCCULT BLD FECES 1-3 TESTS: CPT

## 2024-02-17 PROCEDURE — 36415 COLL VENOUS BLD VENIPUNCTURE: CPT

## 2024-02-17 PROCEDURE — 86900 BLOOD TYPING SEROLOGIC ABO: CPT

## 2024-02-17 PROCEDURE — 71045 X-RAY EXAM CHEST 1 VIEW: CPT

## 2024-02-17 PROCEDURE — 83540 ASSAY OF IRON: CPT

## 2024-02-17 PROCEDURE — 86901 BLOOD TYPING SEROLOGIC RH(D): CPT

## 2024-02-17 PROCEDURE — 85730 THROMBOPLASTIN TIME PARTIAL: CPT

## 2024-02-17 PROCEDURE — 84443 ASSAY THYROID STIM HORMONE: CPT

## 2024-02-17 PROCEDURE — 80048 BASIC METABOLIC PNL TOTAL CA: CPT

## 2024-02-17 PROCEDURE — 93005 ELECTROCARDIOGRAM TRACING: CPT

## 2024-02-17 PROCEDURE — 97161 PT EVAL LOW COMPLEX 20 MIN: CPT

## 2024-02-17 PROCEDURE — 88305 TISSUE EXAM BY PATHOLOGIST: CPT

## 2024-02-17 PROCEDURE — 82728 ASSAY OF FERRITIN: CPT

## 2024-02-17 PROCEDURE — 99239 HOSP IP/OBS DSCHRG MGMT >30: CPT

## 2024-02-17 PROCEDURE — 88312 SPECIAL STAINS GROUP 1: CPT

## 2024-02-17 PROCEDURE — 99285 EMERGENCY DEPT VISIT HI MDM: CPT | Mod: 25

## 2024-02-17 RX ORDER — SUCRALFATE 1 G
10 TABLET ORAL
Qty: 600 | Refills: 0
Start: 2024-02-17 | End: 2024-03-17

## 2024-02-17 RX ORDER — PANTOPRAZOLE SODIUM 20 MG/1
1 TABLET, DELAYED RELEASE ORAL
Qty: 60 | Refills: 0
Start: 2024-02-17

## 2024-02-17 RX ORDER — FERROUS FUMARATE 350(115)MG
1 TABLET ORAL
Qty: 30 | Refills: 0
Start: 2024-02-17 | End: 2024-03-17

## 2024-02-17 RX ORDER — ASPIRIN/CALCIUM CARB/MAGNESIUM 324 MG
1 TABLET ORAL
Refills: 0 | DISCHARGE

## 2024-02-17 RX ADMIN — OXYCODONE HYDROCHLORIDE 5 MILLIGRAM(S): 5 TABLET ORAL at 06:43

## 2024-02-17 RX ADMIN — OXYCODONE HYDROCHLORIDE 5 MILLIGRAM(S): 5 TABLET ORAL at 13:06

## 2024-02-17 RX ADMIN — Medication 650 MILLIGRAM(S): at 05:55

## 2024-02-17 RX ADMIN — PANTOPRAZOLE SODIUM 40 MILLIGRAM(S): 20 TABLET, DELAYED RELEASE ORAL at 05:24

## 2024-02-17 RX ADMIN — Medication 650 MILLIGRAM(S): at 05:25

## 2024-02-17 RX ADMIN — Medication 1 GRAM(S): at 05:24

## 2024-02-17 RX ADMIN — OXYCODONE HYDROCHLORIDE 5 MILLIGRAM(S): 5 TABLET ORAL at 12:16

## 2024-02-17 RX ADMIN — OXYCODONE HYDROCHLORIDE 5 MILLIGRAM(S): 5 TABLET ORAL at 07:13

## 2024-02-17 NOTE — PHYSICAL THERAPY INITIAL EVALUATION ADULT - PERTINENT HX OF CURRENT PROBLEM, REHAB EVAL
This is a 57 y/o F with PMH of HTN, RIYA on parenteral iron therapy, Obesity, and OA who was sent by her PCP for blood transfusion. Patient was feeling weak & tired, and had no energy, seen by her PCP yesterday who ran blood tests, today he called her & sent her to the ED as her hemoglobin is very low. At the ED she was found with hemoglobin of 5.2 gm/dl. Of note that she was seeing a hematologist for RIYA, receiving IV iron on regular basis, last she received 4 units in october & one immediately after thanksgiving, in preparation to her total hip arthroplasty that she had on January 29th, failed PT on POD1 because she was feeling light headed every time she tries to walk, her pre-op Hb was 11.3 gm/dl, her post-op Hb was 9.6 gm/dl, no visible bleeding from anywhere, reports black stools, she is using PO iron supplements at home, tested positive for FOB at the ED.

## 2024-02-17 NOTE — CASE MANAGEMENT PROGRESS NOTE - NSCMPROGRESSNOTE_GEN_ALL_CORE
This CM did not receive a confirmation from [facility] home care for SOC tomorrow. I called them and spoke to Filomena who will reach out to the  to schedule the visit.

## 2024-02-17 NOTE — PROGRESS NOTE ADULT - ASSESSMENT
anemia  hiatal hernia    hgb improved  EGD results d/w pt  outpatient thoracic surgery eval  reg diet  proton pump inhibitor bid  carafate 1g four times a day  may need outpatient iron infusions with dr. martin  no objection to dc   d/w patient and   outpatient follow up with primary GI ([facility])    I reviewed the overnight course of events on the unit, re-confirming the patient history. I discussed the care with the patient and their family  The plan of care was discussed with the physician assistant and modifications were made to the notation where appropriate.   Differential diagnosis and plan of care discussed with patient after the evaluation  35 minutes spent on total encounter of which more than fifty percent of the encounter was spent counseling and/or coordinating care by the attending physician.  Advanced care planning was discussed with patient and family.  Advanced care planning forms were reviewed and discussed.  Risks, benefits and alternatives of gastroenterologic procedures were discussed in detail and all questions were answered.  
57 y/o F with PMH of HTN, RIYA on parenteral iron therapy, Obesity, and OA sent for blood transfusion with hemoglobin as outpatient of 5. Recently had right hip replacement in Jan. 2023 at [facility] and has been taking Ibuprofen for pain and having melena. Likely UGIB.

## 2024-02-17 NOTE — CASE MANAGEMENT PROGRESS NOTE - NSCMPROGRESSNOTE_GEN_ALL_CORE
Pt was cleared medically for transition home today with a LONNY with [facility] with requested LONNY on 2/18/24. Pt was accepted back by the home care agency. The pt has all DME needed in the home prior to this admission from her hip surgery on 1/29/24. Pt has a RW, cane, shower bench, and raised toilet seat. The pt's  will transport the pt home this afternoon. The bedside nurse is aware of the plan of care for transition home today. CM to remain available for post acute needs.

## 2024-02-17 NOTE — DISCHARGE NOTE NURSING/CASE MANAGEMENT/SOCIAL WORK - NSSCNAMETXT_GEN_ALL_CORE
[facility]  Physical therapist to follow. Please contact the home care agency if you have not heard from them by 12 noon on the day after your hospital discharge.

## 2024-02-17 NOTE — DISCHARGE NOTE PROVIDER - CARE PROVIDER_API CALL
Pravin Mota  Gastroenterology  [address] [place] [address]  Phone: [phone]  Fax: [phone]  Follow Up Time:

## 2024-02-17 NOTE — DISCHARGE NOTE PROVIDER - NSDCCPCAREPLAN_GEN_ALL_CORE_FT
PRINCIPAL DISCHARGE DIAGNOSIS  Diagnosis: GIB (gastrointestinal bleeding)  Assessment and Plan of Treatment: s/p egd  continue ppi  avoid nsadisd      SECONDARY DISCHARGE DIAGNOSES  Diagnosis: Symptomatic anemia  Assessment and Plan of Treatment:

## 2024-02-17 NOTE — DISCHARGE NOTE PROVIDER - NSDCMRMEDTOKEN_GEN_ALL_CORE_FT
oxyCODONE 5 mg oral tablet: 1 tab(s) orally every 4 hours as needed for pain  pantoprazole 40 mg oral delayed release tablet: 1 tab(s) orally 2 times a day  sucralfate 1 g/10 mL oral suspension: 10 milliliter(s) orally every 12 hours  Tylenol 500 mg oral tablet: 2 tab(s) orally every 8 hours as needed for  mild pain

## 2024-02-17 NOTE — PROGRESS NOTE ADULT - SUBJECTIVE AND OBJECTIVE BOX
[facility]  Monico Walls PA-C  [address] [place] [address]  [phone]      INTERVAL HPI/OVERNIGHT EVENTS:    patient s/e   bedside  no new events  results of  upper gastrointestinal endoscopy d/w patient     MEDICATIONS  (STANDING):  pantoprazole    Tablet 40 milliGRAM(s) Oral two times a day  sucralfate suspension 1 Gram(s) Oral every 12 hours    MEDICATIONS  (PRN):  acetaminophen     Tablet .. 650 milliGRAM(s) Oral every 6 hours PRN Mild Pain (1 - 3)  oxyCODONE    IR 5 milliGRAM(s) Oral every 4 hours PRN Moderate Pain (4 - 6)      Allergies    Berries (Unknown)  Turkey (Unknown)  Mushrooms (Unknown)  No Known Drug Allergies    Intolerances        ROS:   General:  No  fevers, chills, night sweats, fatigue,   Eyes:  Good vision, no reported pain  ENT:  No sore throat, pain, runny nose, dysphagia  CV:  No pain, palpitations, hypo/hypertension  Resp:  No dyspnea, cough, tachypnea, wheezing  GI:  No pain, No nausea, No vomiting, No diarrhea, No constipation, No weight loss, No fever, No pruritis, No rectal bleeding, No tarry stools, No dysphagia,  :  No pain, bleeding, incontinence, nocturia  Muscle:  No pain, weakness  Neuro:  No weakness, tingling, memory problems  Psych:  No fatigue, insomnia, mood problems, depression  Endocrine:  No polyuria, polydipsia, cold/heat intolerance  Heme:  No petechiae, ecchymosis, easy bruisability  Skin:  No rash, tattoos, scars, edema      PHYSICAL EXAM:   Vital Signs:  Vital Signs Last 24 Hrs  T(C): 36.5 (17 Feb 2024 10:05), Max: 37.2 (16 Feb 2024 14:13)  T(F): 97.7 (17 Feb 2024 10:05), Max: 98.9 (16 Feb 2024 14:13)  HR: 66 (17 Feb 2024 10:05) (61 - 75)  BP: 121/71 (17 Feb 2024 10:05) (121/71 - 156/84)  BP(mean): --  RR: 18 (17 Feb 2024 10:05) (8 - 22)  SpO2: 98% (17 Feb 2024 10:05) (93% - 100%)    Parameters below as of 17 Feb 2024 10:05  Patient On (Oxygen Delivery Method): room air      Daily Height in cm: 171.45 (16 Feb 2024 14:13)    Daily     GENERAL:  Appears stated age,   HEENT:  NC/AT,    CHEST:  Full & symmetric excursion,   HEART:  Regular rhythm,  ABDOMEN:  Soft, non-tender, non-distended,  EXTEREMITIES:  no cyanosis  SKIN:  No rash  NEURO:  Alert,       LABS:                        7.6    9.43  )-----------( 502      ( 17 Feb 2024 08:03 )             25.9     02-17    137  |  104  |  12  ----------------------------<  85  4.3   |  28  |  0.62    Ca    10.1      17 Feb 2024 08:03    TPro  6.4  /  Alb  2.9<L>  /  TBili  0.1<L>  /  DBili  x   /  AST  14  /  ALT  23  /  AlkPhos  97  02-15    PT/INR - ( 15 Feb 2024 21:07 )   PT: 10.9 sec;   INR: 1.00 ratio         PTT - ( 15 Feb 2024 21:07 )  PTT:25.5 sec  Urinalysis Basic - ( 17 Feb 2024 08:03 )    Color: x / Appearance: x / SG: x / pH: x  Gluc: 85 mg/dL / Ketone: x  / Bili: x / Urobili: x   Blood: x / Protein: x / Nitrite: x   Leuk Esterase: x / RBC: x / WBC x   Sq Epi: x / Non Sq Epi: x / Bacteria: x        RADIOLOGY & ADDITIONAL TESTS:  
s/p  upper gastrointestinal endoscopy with bx   gastritis esophagitis  camerons ulcers non bleeding  large 10 cm hiatal hernia    plan    adv diet  d/c planning  po iron therapy  check cbc and transfuse as needed  f/u bx  gerd precautioons  may need hhernia repair    d/w patient  to follow up with dr rawls
Patient is a 58y old  Female who presents with a chief complaint of Sent for abnormal labs. (16 Feb 2024 07:46)       SUBJECTIVE / OVERNIGHT EVENTS: Patient seen and examined at bedside. No acute events overnight. No melena or abdominal pain. Still easily fatigued with exertion.    MEDICATIONS  (STANDING):  pantoprazole  Injectable 40 milliGRAM(s) IV Push two times a day    MEDICATIONS  (PRN):  acetaminophen     Tablet .. 650 milliGRAM(s) Oral every 6 hours PRN Mild Pain (1 - 3)  oxyCODONE    IR 5 milliGRAM(s) Oral every 4 hours PRN Moderate Pain (4 - 6)      CAPILLARY BLOOD GLUCOSE        I&O's Summary    15 Feb 2024 07:01  -  16 Feb 2024 07:00  --------------------------------------------------------  IN: 0 mL / OUT: 1 mL / NET: -1 mL        PHYSICAL EXAM:  Vital Signs Last 24 Hrs  T(C): 37.1 (16 Feb 2024 05:56), Max: 37.5 (15 Feb 2024 20:28)  T(F): 98.7 (16 Feb 2024 05:56), Max: 99.5 (15 Feb 2024 20:28)  HR: 73 (16 Feb 2024 05:56) (73 - 95)  BP: 139/80 (16 Feb 2024 05:56) (128/78 - 165/60)  BP(mean): --  RR: 17 (16 Feb 2024 05:56) (17 - 18)  SpO2: 98% (16 Feb 2024 05:56) (97% - 100%)    Parameters below as of 16 Feb 2024 05:56  Patient On (Oxygen Delivery Method): room air        GEN: female in NAD, appears comfortable, no diaphoresis  EYES: No scleral injection, EOMI  ENTM: neck supple & symmetric without tracheal deviation, moist membranes, no gross hearing impairment, thyroid gland not enlarged  CV: +S1/S2, no m/r/g, no abdominal bruit, no LE edema  RESP: breathing comfortably, no respiratory accessory muscle use, CTAB, no w/r/r  GI: normoactive BS, soft, NTND, no rebounding/guarding, no palpable masses    LABS:                        6.7    7.94  )-----------( 468      ( 16 Feb 2024 08:17 )             22.8     02-15    141  |  105  |  15  ----------------------------<  145<H>  3.5   |  28  |  0.68    Ca    9.7      15 Feb 2024 21:07    TPro  6.4  /  Alb  2.9<L>  /  TBili  0.1<L>  /  DBili  x   /  AST  14  /  ALT  23  /  AlkPhos  97  02-15    PT/INR - ( 15 Feb 2024 21:07 )   PT: 10.9 sec;   INR: 1.00 ratio         PTT - ( 15 Feb 2024 21:07 )  PTT:25.5 sec      Urinalysis Basic - ( 15 Feb 2024 21:07 )    Color: x / Appearance: x / SG: x / pH: x  Gluc: 145 mg/dL / Ketone: x  / Bili: x / Urobili: x   Blood: x / Protein: x / Nitrite: x   Leuk Esterase: x / RBC: x / WBC x   Sq Epi: x / Non Sq Epi: x / Bacteria: x          RADIOLOGY & ADDITIONAL TESTS:  Results Reviewed:   Imaging Personally Reviewed:  Electrocardiogram Personally Reviewed:    COORDINATION OF CARE:  Care Discussed with Consultants/Other Providers [Y/N]:  Prior or Outpatient Records Reviewed [Y/N]:

## 2024-02-17 NOTE — DISCHARGE NOTE PROVIDER - HOSPITAL COURSE
57 y/o F with PMH of HTN, RIYA on parenteral iron therapy, Obesity, and OA sent for blood transfusion with hemoglobin as outpatient of 5. Recently had right hip replacement in Jan. 2023 at [facility] and has been taking Ibuprofen for pain and having melena. Likely UGIB.        GI bleeding.    - Likely UGIB in setting of NSAID use  - Protonix   s/p  upper gastrointestinal endoscopy with bx   gastritis esophagitis  camerons ulcers non bleeding  large 10 cm hiatal hernia  f/u outpt for jose        Symptomatic anemia.    Transfuse to goal Hg of 7  - Follows with Jones Mcgee for RIYA.     S/P hip replacement, right.   - Recent surgery at [facility] on 1/30 and post-op unremarkable  - PT consult (from home)  - Pain control with Tylenol & Oxycodone PRN.

## 2024-02-17 NOTE — DISCHARGE NOTE NURSING/CASE MANAGEMENT/SOCIAL WORK - PATIENT PORTAL LINK FT
You can access the FollowMyHealth Patient Portal offered by [facility] by registering at the following website: [email or web address]. By joining [employer]’s FollowMyHealth portal, you will also be able to view your health information using other applications (apps) compatible with our system.

## 2024-02-17 NOTE — DISCHARGE NOTE NURSING/CASE MANAGEMENT/SOCIAL WORK - NSDCPEFALRISK_GEN_ALL_CORE
For information on Fall & Injury Prevention, visit: [email or web address] OR  [email or web address] OR  https://www.cdc.gov/steadi/patient.html

## 2024-02-18 LAB
A1C WITH ESTIMATED AVERAGE GLUCOSE RESULT: 5.3 % — SIGNIFICANT CHANGE UP (ref 4–5.6)
ESTIMATED AVERAGE GLUCOSE: 105 MG/DL — SIGNIFICANT CHANGE UP (ref 68–114)

## 2025-06-04 NOTE — H&P ADULT - NSICDXPASTMEDICALHX_GEN_ALL_CORE_FT
Otis Zalejandra will need follow-up in 4-6 wks with neurovascular team for Embolic stroke secondary to known diagnosis of atrial fibrillation in 60 minute appointment. They will not require outpatient neurological testing.   
PAST MEDICAL HISTORY:  Iron deficiency anemia     OA (osteoarthritis)     Obesity

## (undated) DEVICE — GLV 8 PROTEXIS (WHITE)

## (undated) DEVICE — SUCTION YANKAUER OPEN TIP NO VENT CURVE

## (undated) DEVICE — SNARE OVAL LOOP MICOR

## (undated) DEVICE — PROBE FIAPC CIRC O.D. 2.3MM/6.9FR LNTH 220CM/7.2FT

## (undated) DEVICE — TRAP SUCTION POLYP ENDODYNAMIC

## (undated) DEVICE — TUBING CAP SET ENDO 24HR USE GI

## (undated) DEVICE — BITE BLOCK ADULT 20 X 27MM (GREEN)

## (undated) DEVICE — DRSG CURITY GAUZE SPONGE 4 X 4" 12-PLY

## (undated) DEVICE — TUBING SUCTION 20FT

## (undated) DEVICE — ELCTR NESSY OMEGA RETURN 85CM W/4M CBL

## (undated) DEVICE — SYR CATH TIP 2 OZ

## (undated) DEVICE — GOWN ISOLATION WHITE

## (undated) DEVICE — TUBING SUCTION CONN 6FT STERILE

## (undated) DEVICE — SYR ALLIANCE II INFLATION 60ML

## (undated) DEVICE — SOL IRR POUR NS 0.9% 1000ML

## (undated) DEVICE — CATH ELECHMSTAT  INJ 7FR 210CM

## (undated) DEVICE — Device

## (undated) DEVICE — PROBE FIAPC DIA 2.3MM/7FR LNTH 220CM/7.2FT

## (undated) DEVICE — NDL INJ SCLERO INTERJECT 25G

## (undated) DEVICE — SOL IRR POUR H2O 1000ML

## (undated) DEVICE — VENODYNE/SCD SLEEVE CALF MEDIUM

## (undated) DEVICE — RETRIEVER ROTH NET PLATINUM-UNIVERSAL

## (undated) DEVICE — WARMING BLANKET FULL ADULT

## (undated) DEVICE — APPLICATOR Q TIP 6" WOOD STEM